# Patient Record
Sex: FEMALE | Race: WHITE | NOT HISPANIC OR LATINO
[De-identification: names, ages, dates, MRNs, and addresses within clinical notes are randomized per-mention and may not be internally consistent; named-entity substitution may affect disease eponyms.]

---

## 2017-01-30 ENCOUNTER — OTHER (OUTPATIENT)
Age: 22
End: 2017-01-30

## 2017-10-16 ENCOUNTER — APPOINTMENT (OUTPATIENT)
Dept: OBGYN | Facility: CLINIC | Age: 22
End: 2017-10-16
Payer: COMMERCIAL

## 2017-10-16 VITALS
HEIGHT: 66 IN | BODY MASS INDEX: 22.66 KG/M2 | HEART RATE: 46 BPM | DIASTOLIC BLOOD PRESSURE: 82 MMHG | WEIGHT: 141 LBS | SYSTOLIC BLOOD PRESSURE: 123 MMHG

## 2017-10-16 DIAGNOSIS — Z30.09 ENCOUNTER FOR OTHER GENERAL COUNSELING AND ADVICE ON CONTRACEPTION: ICD-10-CM

## 2017-10-16 DIAGNOSIS — Z01.419 ENCOUNTER FOR GYNECOLOGICAL EXAMINATION (GENERAL) (ROUTINE) W/OUT ABNORMAL FINDINGS: ICD-10-CM

## 2017-10-16 DIAGNOSIS — Z30.41 ENCOUNTER FOR SURVEILLANCE OF CONTRACEPTIVE PILLS: ICD-10-CM

## 2017-10-16 DIAGNOSIS — N76.0 ACUTE VAGINITIS: ICD-10-CM

## 2017-10-16 PROCEDURE — 99213 OFFICE O/P EST LOW 20 MIN: CPT | Mod: 25

## 2017-10-16 PROCEDURE — 99395 PREV VISIT EST AGE 18-39: CPT

## 2017-10-16 RX ORDER — IBUPROFEN 200 MG/1
TABLET, COATED ORAL
Refills: 0 | Status: ACTIVE | COMMUNITY

## 2017-10-20 LAB
C TRACH RRNA SPEC QL NAA+PROBE: NOT DETECTED
CANDIDA VAG CYTO: NOT DETECTED
G VAGINALIS+PREV SP MTYP VAG QL MICRO: NOT DETECTED
N GONORRHOEA RRNA SPEC QL NAA+PROBE: NOT DETECTED
SOURCE AMPLIFICATION: NORMAL
T VAGINALIS VAG QL WET PREP: NOT DETECTED

## 2017-10-22 LAB — CYTOLOGY CVX/VAG DOC THIN PREP: NORMAL

## 2017-10-23 ENCOUNTER — APPOINTMENT (OUTPATIENT)
Dept: OBGYN | Facility: CLINIC | Age: 22
End: 2017-10-23
Payer: COMMERCIAL

## 2017-10-23 ENCOUNTER — ASOB RESULT (OUTPATIENT)
Age: 22
End: 2017-10-23

## 2017-10-23 PROCEDURE — 76830 TRANSVAGINAL US NON-OB: CPT

## 2018-10-18 ENCOUNTER — APPOINTMENT (OUTPATIENT)
Dept: OBGYN | Facility: CLINIC | Age: 23
End: 2018-10-18

## 2021-08-10 ENCOUNTER — TRANSCRIPTION ENCOUNTER (OUTPATIENT)
Age: 26
End: 2021-08-10

## 2023-10-03 ENCOUNTER — EMERGENCY (EMERGENCY)
Facility: HOSPITAL | Age: 28
LOS: 1 days | Discharge: HOME | End: 2023-10-03
Attending: STUDENT IN AN ORGANIZED HEALTH CARE EDUCATION/TRAINING PROGRAM
Payer: COMMERCIAL

## 2023-10-03 VITALS
WEIGHT: 139.99 LBS | HEART RATE: 56 BPM | TEMPERATURE: 98 F | SYSTOLIC BLOOD PRESSURE: 149 MMHG | OXYGEN SATURATION: 99 % | HEIGHT: 66 IN | DIASTOLIC BLOOD PRESSURE: 92 MMHG | RESPIRATION RATE: 18 BRPM

## 2023-10-03 PROCEDURE — 99283 EMERGENCY DEPT VISIT LOW MDM: CPT

## 2023-10-03 PROCEDURE — 99282 EMERGENCY DEPT VISIT SF MDM: CPT
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
## 2023-10-04 NOTE — ED PROVIDER NOTE - PROGRESS NOTE DETAILS
Rico GALVAN: Patient is stable for discharge. Return to ED parameters reviewed and patient verbalized understanding.  All questions answered, all concerns addressed.

## 2023-10-04 NOTE — ED PROVIDER NOTE - NS ED ROS FT
CONSTITUTIONAL: No weakness, fevers  EYES/ENT: No visual changes;  No throat pain   NECK: No pain   RESPIRATORY: No cough, shortness of breath  CARDIOVASCULAR: No chest pain, palpitations  GASTROINTESTINAL: No abdominal pain, nausea, vomiting, diarrhea or constipation.  GENITOURINARY: No dysuria, frequency  NEUROLOGICAL: No numbness or weakness  SKIN: No rashes, or lesions     All other review of systems is negative unless indicated above.
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
## 2023-10-04 NOTE — ED PROVIDER NOTE - NSFOLLOWUPCLINICS_GEN_ALL_ED_FT
[facility] - ENT  Otolaryngology (ENT)  [address] [place] [address]  Phone: [phone]  Fax:
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
## 2023-10-04 NOTE — ED ADULT NURSE NOTE - NSFALLUNIVINTERV_ED_ALL_ED
Bed/Stretcher in lowest position, wheels locked, appropriate side rails in place/Call bell, personal items and telephone in reach/Instruct patient to call for assistance before getting out of bed/chair/stretcher/Non-slip footwear applied when patient is off stretcher/[place] to call system/Physically safe environment - no spills, clutter or unnecessary equipment/Purposeful proactive rounding/Room/bathroom lighting operational, light cord in reach
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
## 2023-10-04 NOTE — ED PROVIDER NOTE - PATIENT PORTAL LINK FT
You can access the FollowMyHealth Patient Portal offered by [facility] by registering at the following website: [email or web address]. By joining [facility]’s FollowMyHealth portal, you will also be able to view your health information using other applications (apps) compatible with our system.

## 2023-10-04 NOTE — ED PROVIDER NOTE - NSFOLLOWUPINSTRUCTIONS_ED_ALL_ED_FT
A nosebleed is when blood comes out of the nose. Nosebleeds are common and can be caused by many things. They are usually not a sign of a serious medical problem.    Follow these instructions at home:  When you have a nosebleed:    The correct and incorrect way to hold your head and fingers for first aid during a nosebleed.   Sit down.  Tilt your head forward a little.  Follow these steps:  Pinch your nose with a clean towel or tissue.  Keep pinching your nose for 5 minutes. Do not let go.  After 5 minutes, let go of your nose.  Keep doing these steps until the bleeding stops.  Do not put tissues or other things in your nose to stop the bleeding.  Avoid lying down or putting your head back.  Use a nose spray decongestant as told by your doctor.  After a nosebleed:    Try not to blow your nose or sniffle for several hours.  Try not to strain, lift, or bend at the waist for several days.  Aspirin and medicines that thin your blood make bleeding more likely. If you take these medicines:  Ask your doctor if you should stop taking them or if you should change how much you take.  Do not stop taking the medicine unless your doctor tells you to.  If your nosebleed was caused by dryness, use over-the-counter saline nasal spray or gel and a humidifier as told by your doctor. This will keep the inside of your nose moist and allow it to heal. If you need to use nasal spray or gel:  Choose one that is water-soluble.  Use only as much as you need and use it only as often as needed.  Do not lie down right away after you use it.  If you get nosebleeds often, talk with your doctor about treatments. These may include:  Nasal cautery. A chemical swab or electrical device is used to lightly burn tiny blood vessels inside the nose. This helps stop or prevent nosebleeds.  Nasal packing. A gauze or other material is placed in the nose to keep constant pressure on the bleeding area.  Contact a doctor if:  You have a fever.  You get nosebleeds often.  You get nosebleeds more often than usual.  You bruise very easily.  You have something stuck in your nose.  You are bleeding in your mouth.  You vomit or cough up brown material.  You get a nosebleed after you start a new medicine.  Get help right away if:  You have a nosebleed after you fall or hurt your head.  Your nosebleed does not go away after 20 minutes.  You feel dizzy or weak.  You have unusual bleeding from other parts of your body.  You have unusual bruising on other parts of your body.  You get sweaty.  You vomit blood.    Summary    Nosebleeds are common. They are usually not a sign of a serious medical problem.  When you have a nosebleed, sit down and tilt your head a little forward. Pinch your nose with a clean tissue for 5 minutes.  Use saline spray or saline gel and a humidifier as told by your doctor.  Get help right away if your nosebleed does not go away after 20 minutes.  This information is not intended to replace advice given to you by your health care provider. Make sure you discuss any questions you have with your health care provider.

## 2023-10-04 NOTE — ED PROVIDER NOTE - PHYSICAL EXAMINATION
PHYSICAL EXAM:    GENERAL: Lying in bed comfortably  HEAD:  Atraumatic, Normocephalic  EYES: EOMI, PERRLA, conjunctiva and sclera clear  ENT: Non tender on palpation of nasal bridge, has slight tenderness on palpation of greater alar cartilage only, no obvious deformity, reddening of nasal mucosa on right side, no active bleeding  NECK: No ML cervical tenderness   LUNG: CTA b/l; no r/r/w  HEART: RRR, +S1/S2; No m/r/g  ABDOMEN: soft, NT/ND; BS audible   EXTREMITIES:  2+ Peripheral Pulses, brisk cap refill.   NERVOUS SYSTEM:  AAOx3, speech clear. No sensory/motor deficits   MSK: FROM all 4 extremities, full and equal strength  SKIN: No rashes or lesions